# Patient Record
Sex: FEMALE | Race: WHITE | NOT HISPANIC OR LATINO | Employment: STUDENT | URBAN - METROPOLITAN AREA
[De-identification: names, ages, dates, MRNs, and addresses within clinical notes are randomized per-mention and may not be internally consistent; named-entity substitution may affect disease eponyms.]

---

## 2021-02-17 ENCOUNTER — OFFICE VISIT (OUTPATIENT)
Dept: URGENT CARE | Facility: CLINIC | Age: 21
End: 2021-02-17
Payer: COMMERCIAL

## 2021-02-17 VITALS
RESPIRATION RATE: 15 BRPM | OXYGEN SATURATION: 100 % | SYSTOLIC BLOOD PRESSURE: 125 MMHG | TEMPERATURE: 98.7 F | HEART RATE: 66 BPM | DIASTOLIC BLOOD PRESSURE: 71 MMHG

## 2021-02-17 DIAGNOSIS — B35.4 TINEA CORPORIS: Primary | ICD-10-CM

## 2021-02-17 PROCEDURE — 99213 OFFICE O/P EST LOW 20 MIN: CPT | Performed by: NURSE PRACTITIONER

## 2021-02-17 RX ORDER — CLOTRIMAZOLE 1 %
CREAM (GRAM) TOPICAL 2 TIMES DAILY
Qty: 113 G | Refills: 1 | Status: SHIPPED | OUTPATIENT
Start: 2021-02-17 | End: 2021-03-10

## 2021-02-17 RX ORDER — NORGESTREL-ETHINYL ESTRADIOL 0.3-0.03MG
1 TABLET ORAL DAILY
COMMUNITY
Start: 2021-01-28

## 2021-02-17 RX ORDER — PROPRANOLOL HCL 60 MG
60 CAPSULE, EXTENDED RELEASE 24HR ORAL DAILY
COMMUNITY
Start: 2021-01-27

## 2021-02-18 NOTE — PROGRESS NOTES
Assessment/Plan    Tinea corporis [B35 4]  1  Tinea corporis  clotrimazole (LOTRIMIN) 1 % cream         Subjective:     Patient ID: Paxton Crowley is a 24 y o  female  Reason For Visit / Chief Complaint  Chief Complaint   Patient presents with    Rash     About a week ago, Pt noticed sporatic rashes - located on her right lef, upper back, and stomach  She states there is no pain, no irritation, or open wounds  It's visually more a concern than anything  This is a 61-year-old female patient who presents to the Urgent Care today  Patient notice some rashes to her body over the past week  Patient is complaining of a circular rash to her leg, forearm, abdomen and back  Patient states that they are not itchy or painful  But they will not go away  Patient is a 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 in his dorm with 3 other students  Patient also goes to the gym  Patient denies fever or chills  Patient denies chest pain or shortness of breath  Patient is otherwise healthy  History reviewed  No pertinent past medical history  Past Surgical History:   Procedure Laterality Date    ADENOIDECTOMY      TONSILLECTOMY         History reviewed  No pertinent family history  Review of Systems   Constitutional: Negative for chills and fever  Respiratory: Negative for shortness of breath  Cardiovascular: Negative for chest pain  Skin: Positive for rash  Neurological: Negative for headaches  Objective:    /71   Pulse 66   Temp 98 7 °F (37 1 °C) (Tympanic)   Resp 15   SpO2 100%   Breastfeeding No     Physical Exam  Vitals signs and nursing note reviewed  Constitutional:       Appearance: Normal appearance  She is normal weight  She is not ill-appearing  HENT:      Head: Normocephalic and atraumatic  Nose: Nose normal    Cardiovascular:      Rate and Rhythm: Normal rate     Pulmonary:      Effort: Pulmonary effort is normal    Musculoskeletal: Normal range of motion  Skin:     General: Skin is warm and dry  Capillary Refill: Capillary refill takes less than 2 seconds  Comments: 4 areas of rash that are circular with a red ring and central clearing to right lower leg, right forearm, right flank and back  Area range from 1 5 cm to 3 5 cm   Neurological:      Mental Status: She is alert and oriented to person, place, and time     Psychiatric:         Mood and Affect: Mood normal

## 2021-02-18 NOTE — PATIENT INSTRUCTIONS
We saw you today for ringworm  Apply the cream to all 4 areas twice a day for the next 1 week to 3 weeks  Once the areas have completely resolved you can discontinue the medication  You may use the medicine for up to 3 weeks  For example, if it resolves in 2 weeks, you can stop using the cream at that point  Follow-up with your family doctor for any other concerns  Practice good hand hygiene  Clean the bathroom and shower after using

## 2021-11-01 ENCOUNTER — OFFICE VISIT (OUTPATIENT)
Dept: URGENT CARE | Facility: CLINIC | Age: 21
End: 2021-11-01
Payer: COMMERCIAL

## 2021-11-01 VITALS — OXYGEN SATURATION: 97 % | RESPIRATION RATE: 14 BRPM | TEMPERATURE: 99.1 F | HEART RATE: 97 BPM

## 2021-11-01 DIAGNOSIS — B34.9 VIRAL SYNDROME: Primary | ICD-10-CM

## 2021-11-01 PROCEDURE — 99213 OFFICE O/P EST LOW 20 MIN: CPT | Performed by: PHYSICIAN ASSISTANT

## 2021-11-01 PROCEDURE — U0005 INFEC AGEN DETEC AMPLI PROBE: HCPCS | Performed by: PHYSICIAN ASSISTANT

## 2021-11-01 PROCEDURE — U0003 INFECTIOUS AGENT DETECTION BY NUCLEIC ACID (DNA OR RNA); SEVERE ACUTE RESPIRATORY SYNDROME CORONAVIRUS 2 (SARS-COV-2) (CORONAVIRUS DISEASE [COVID-19]), AMPLIFIED PROBE TECHNIQUE, MAKING USE OF HIGH THROUGHPUT TECHNOLOGIES AS DESCRIBED BY CMS-2020-01-R: HCPCS | Performed by: PHYSICIAN ASSISTANT

## 2021-11-01 PROCEDURE — 87070 CULTURE OTHR SPECIMN AEROBIC: CPT | Performed by: PHYSICIAN ASSISTANT

## 2021-11-02 LAB — SARS-COV-2 RNA RESP QL NAA+PROBE: NEGATIVE

## 2021-11-03 LAB — BACTERIA THROAT CULT: NORMAL

## 2021-11-05 ENCOUNTER — OFFICE VISIT (OUTPATIENT)
Dept: URGENT CARE | Facility: CLINIC | Age: 21
End: 2021-11-05
Payer: COMMERCIAL

## 2021-11-05 VITALS
RESPIRATION RATE: 16 BRPM | OXYGEN SATURATION: 99 % | HEART RATE: 92 BPM | TEMPERATURE: 97.9 F | WEIGHT: 148 LBS | BODY MASS INDEX: 26.22 KG/M2 | HEIGHT: 63 IN

## 2021-11-05 DIAGNOSIS — J06.9 VIRAL URI WITH COUGH: Primary | ICD-10-CM

## 2021-11-05 PROCEDURE — 99213 OFFICE O/P EST LOW 20 MIN: CPT | Performed by: PHYSICIAN ASSISTANT

## 2021-11-05 RX ORDER — BENZONATATE 200 MG/1
200 CAPSULE ORAL 3 TIMES DAILY PRN
Qty: 20 CAPSULE | Refills: 0 | Status: SHIPPED | OUTPATIENT
Start: 2021-11-05

## 2023-01-24 ENCOUNTER — OFFICE VISIT (OUTPATIENT)
Dept: URGENT CARE | Facility: CLINIC | Age: 23
End: 2023-01-24

## 2023-01-24 VITALS
OXYGEN SATURATION: 98 % | TEMPERATURE: 98.6 F | HEIGHT: 63 IN | RESPIRATION RATE: 16 BRPM | WEIGHT: 140 LBS | HEART RATE: 76 BPM | BODY MASS INDEX: 24.8 KG/M2

## 2023-01-24 DIAGNOSIS — H16.002 CORNEAL ULCER OF LEFT EYE: Primary | ICD-10-CM

## 2023-01-24 RX ORDER — CIPROFLOXACIN HYDROCHLORIDE 3.5 MG/ML
1 SOLUTION/ DROPS TOPICAL
Qty: 5 ML | Refills: 0 | Status: SHIPPED | OUTPATIENT
Start: 2023-01-24

## 2023-01-24 NOTE — PROGRESS NOTES
[facility]        NAME: Naheed Zhang is a 25 y o  female  : 2000    MRN: [id number]  DATE: 2023  TIME: 10:05 AM    Pulse 76   Temp 98 6 °F (37 °C)   Resp 16   Ht 5' 3" (1 6 m)   Wt 63 5 kg (140 lb)   SpO2 98%   BMI 24 80 kg/m²     Assessment and Plan   Corneal ulcer of left eye [H16 002]  1  Corneal ulcer of left eye  ciprofloxacin (CILOXAN) 0 3 % ophthalmic solution            Patient Instructions       Follow up with PCP in 3-5 days  Proceed to  ER if symptoms worsen  Chief Complaint     Chief Complaint   Patient presents with   • Eye Swelling     Pt presents with eye swelling and discharge that started this morning  Pt also states sensitivity to light  She states she wears contact lenses, does not have them in and this time, and checked that the whole lense was removed last night  Concerned about possible foreign body in eye  History of Present Illness       Pt with left eye irritation since taking contact lens out last jan       Review of Systems   Review of Systems   Constitutional: Negative  HENT: Negative  Eyes: Negative  Respiratory: Negative  Cardiovascular: Negative  Gastrointestinal: Negative  Endocrine: Negative  Genitourinary: Negative  Musculoskeletal: Negative  Skin: Negative  Allergic/Immunologic: Negative  Neurological: Negative  Hematological: Negative  Psychiatric/Behavioral: Negative  All other systems reviewed and are negative          Current Medications       Current Outpatient Medications:   •  ciprofloxacin (CILOXAN) 0 3 % ophthalmic solution, Administer 1 drop into the left eye every 2 (two) hours, Disp: 5 mL, Rfl: 0  •  Cryselle-28 0 3-30 MG-MCG per tablet, Take 1 tablet by mouth daily, Disp: , Rfl:   •  benzonatate (TESSALON) 200 MG capsule, Take 1 capsule (200 mg total) by mouth 3 (three) times a day as needed for cough (Patient not taking: Reported on 2023), Disp: 20 capsule, Rfl: 0  •  clotrimazole (LOTRIMIN) 1 % cream, Apply topically 2 (two) times a day for 21 days, Disp: 113 g, Rfl: 1  •  propranolol (INDERAL LA) 60 mg 24 hr capsule, Take 60 mg by mouth daily (Patient not taking: Reported on 11/5/2021), Disp: , Rfl:     Current Allergies     Allergies as of 01/24/2023   • (No Known Allergies)            The following portions of the patient's history were reviewed and updated as appropriate: allergies, current medications, past family history, past medical history, past social history, past surgical history and problem list      History reviewed  No pertinent past medical history  Past Surgical History:   Procedure Laterality Date   • ADENOIDECTOMY     • TONSILLECTOMY         History reviewed  No pertinent family history  Medications have been verified  Objective   Pulse 76   Temp 98 6 °F (37 °C)   Resp 16   Ht 5' 3" (1 6 m)   Wt 63 5 kg (140 lb)   SpO2 98%   BMI 24 80 kg/m²        Physical Exam     Physical Exam  Vitals and nursing note reviewed  Constitutional:       Appearance: Normal appearance  She is normal weight  Comments: Pt understands need for recheck with ophth specialist with corneal ulcer    HENT:      Head: Normocephalic and atraumatic  Right Ear: Tympanic membrane, ear canal and external ear normal       Left Ear: Tympanic membrane, ear canal and external ear normal       Nose: Nose normal       Mouth/Throat:      Mouth: Mucous membranes are moist       Pharynx: Oropharynx is clear  Eyes:      Extraocular Movements: Extraocular movements intact  Pupils: Pupils are equal, round, and reactive to light  Comments: Conj injected  Left eye medial 3mm corneal ulcer cloudy with fluorsceine uptake no f/o seen  No f o under either lid   +red reflex anterior chamber wnl     Left 20/30 right 20/30    Cardiovascular:      Rate and Rhythm: Normal rate and regular rhythm  Pulses: Normal pulses  Heart sounds: Normal heart sounds  Pulmonary:      Effort: Pulmonary effort is normal       Breath sounds: Normal breath sounds  Abdominal:      General: Abdomen is flat  Bowel sounds are normal       Palpations: Abdomen is soft  Musculoskeletal:         General: Normal range of motion  Cervical back: Normal range of motion and neck supple  Skin:     General: Skin is warm  Capillary Refill: Capillary refill takes less than 2 seconds  Neurological:      General: No focal deficit present  Mental Status: She is alert and oriented to person, place, and time     Psychiatric:         Mood and Affect: Mood normal